# Patient Record
Sex: FEMALE | URBAN - METROPOLITAN AREA
[De-identification: names, ages, dates, MRNs, and addresses within clinical notes are randomized per-mention and may not be internally consistent; named-entity substitution may affect disease eponyms.]

---

## 2020-09-21 ENCOUNTER — OUT OF OFFICE VISIT (OUTPATIENT)
Dept: URBAN - METROPOLITAN AREA MEDICAL CENTER 10 | Facility: MEDICAL CENTER | Age: 35
End: 2020-09-21
Payer: COMMERCIAL

## 2020-09-21 DIAGNOSIS — Z98.84 BARIATRIC SURG STAT-UNSP: ICD-10-CM

## 2020-09-21 DIAGNOSIS — R10.13 ABDOMINAL DISCOMFORT, EPIGASTRIC: ICD-10-CM

## 2020-09-21 DIAGNOSIS — K92.1 BLACK STOOL: ICD-10-CM

## 2020-09-21 DIAGNOSIS — R74.0 TRANSAMINITIS: ICD-10-CM

## 2020-09-21 PROCEDURE — 99213 OFFICE O/P EST LOW 20 MIN: CPT | Performed by: INTERNAL MEDICINE

## 2020-09-21 PROCEDURE — 99205 OFFICE O/P NEW HI 60 MIN: CPT | Performed by: INTERNAL MEDICINE

## 2020-10-16 ENCOUNTER — OFFICE VISIT (OUTPATIENT)
Dept: URBAN - METROPOLITAN AREA TELEHEALTH 2 | Facility: TELEHEALTH | Age: 35
End: 2020-10-16
Payer: COMMERCIAL

## 2020-10-16 DIAGNOSIS — U07.1 COVID-19 VIRUS INFECTION: ICD-10-CM

## 2020-10-16 DIAGNOSIS — Z98.84 GASTRIC BYPASS STATUS FOR OBESITY: ICD-10-CM

## 2020-10-16 DIAGNOSIS — R74.8 ELEVATED LIVER ENZYMES: ICD-10-CM

## 2020-10-16 DIAGNOSIS — R10.13 EPIGASTRIC PAIN: ICD-10-CM

## 2020-10-16 PROCEDURE — G8482 FLU IMMUNIZE ORDER/ADMIN: HCPCS | Performed by: INTERNAL MEDICINE

## 2020-10-16 PROCEDURE — G8417 CALC BMI ABV UP PARAM F/U: HCPCS | Performed by: INTERNAL MEDICINE

## 2020-10-16 PROCEDURE — 99214 OFFICE O/P EST MOD 30 MIN: CPT | Performed by: INTERNAL MEDICINE

## 2020-10-16 PROCEDURE — G9903 PT SCRN TBCO ID AS NON USER: HCPCS | Performed by: INTERNAL MEDICINE

## 2020-10-16 PROCEDURE — G8427 DOCREV CUR MEDS BY ELIG CLIN: HCPCS | Performed by: INTERNAL MEDICINE

## 2020-10-16 PROCEDURE — 1036F TOBACCO NON-USER: CPT | Performed by: INTERNAL MEDICINE

## 2020-10-16 RX ORDER — PANTOPRAZOLE SODIUM 40 MG/1
1 TABLET TABLET, DELAYED RELEASE ORAL ONCE A DAY
Qty: 20 | Refills: 1 | OUTPATIENT
Start: 2020-10-16

## 2020-10-16 RX ORDER — SUCRALFATE 1 G/1
1 TABLET ON AN EMPTY STOMACH TABLET ORAL
Qty: 14 | Refills: 1 | OUTPATIENT
Start: 2020-10-16 | End: 2020-10-30

## 2020-10-16 NOTE — HPI-TODAY'S VISIT:
The patient was admitted to [facility] and seen in consultation by Dr. Hinds. She presented with abdominal pain, which began 3  days prior, sharp, moderate, localized to the epigastric region. On admission the degree of pain was minimal. She had tried Pepto bismol at home and c/o dark stools. No BRBPR. She underwent a gastric sleeve in May 2020.  She denied fevers/chills/CP/SOB/cough/dysuria/hematuria/n/v.   She was noted to have stable Hb but elevated LFTs. Viral hepatitis was negative. COVID testing was positive. She was placed on Pantoprazole QD and Carafate QAC. She tells me today that she is feeling better.  She did have some mild epigastric pain (3-4/10), and it short lived. She has not had any nausea or vomiting. Stools havbe been brown. Appetite has been good. Weight has been stable. She is running low on her meds.  Summary of prior workup: - Labs on 9/22/27 revealed a sodium of 139, potassium 3.7, glucose 78, BUN 11, Cr 0.4, calcium 7.4, AST 78, , AP 92. TBili 0.4, Lipase 61. HbA1c 5.2%. TG 79. Normal B1, B6 and zinc level. WBC 3.5, hemoglobin 13.9, MCV 93. Folate > 20, Ferritin 38, B12 814, Viral hepatitis antibody negative for Hep A, B or C. COVID Positive. FOBT negative. - CT abd/pelvis 9/21/20: No gastric ulceration identified. Fluid-filled small bowel loops with mild enhancement may represent enteritis, predominantly located in the left abdomen. Trace ascites.

## 2022-01-05 ENCOUNTER — OFFICE VISIT (OUTPATIENT)
Dept: URBAN - METROPOLITAN AREA CLINIC 98 | Facility: CLINIC | Age: 37
End: 2022-01-05

## 2022-01-10 ENCOUNTER — OFFICE VISIT (OUTPATIENT)
Dept: URBAN - METROPOLITAN AREA TELEHEALTH 2 | Facility: TELEHEALTH | Age: 37
End: 2022-01-10
Payer: COMMERCIAL

## 2022-01-10 DIAGNOSIS — E27.8 ADRENAL NODULE: ICD-10-CM

## 2022-01-10 DIAGNOSIS — Z98.84 BARIATRIC SURGERY STATUS: ICD-10-CM

## 2022-01-10 DIAGNOSIS — U07.1 COVID-19: ICD-10-CM

## 2022-01-10 DIAGNOSIS — R10.13 EPIGASTRIC PAIN: ICD-10-CM

## 2022-01-10 DIAGNOSIS — K21.9 GERD WITHOUT ESOPHAGITIS: ICD-10-CM

## 2022-01-10 PROCEDURE — 99203 OFFICE O/P NEW LOW 30 MIN: CPT | Performed by: INTERNAL MEDICINE

## 2022-01-10 RX ORDER — ESOMEPRAZOLE MAGNESIUM 20 MG/1
1 CAPSULE CAPSULE, DELAYED RELEASE ORAL ONCE A DAY
Status: ACTIVE | COMMUNITY

## 2022-01-10 RX ORDER — SUCRALFATE 1 G/1
1 TABLET ON AN EMPTY STOMACH TABLET ORAL THREE TIMES A DAY
Status: ACTIVE | COMMUNITY

## 2022-01-10 RX ORDER — MULTIVIT-MIN/IRON/FOLIC ACID/K 18-600-40
AS DIRECTED CAPSULE ORAL
Status: ACTIVE | COMMUNITY

## 2022-01-10 NOTE — HPI-TODAY'S VISIT:
Patient referred by Azeem Pedraza MD fof evaluation of abdominal pain. Copy of this consult OV sent to Dr. Pedraza. This is a Telehealth OV to which patient has agreed to. Patient's @ home during the virtual OV. Limitations of telehealth discussed; she understands and agrees to proceed. 37 yo pt s/p Sleeve gastrectomy 5/20, ( lost 65 lbs after surgery ),w sudden onset of epigastric pain w/o radiation, w no N/V/constipation / diarrhea nor fever or chills and no cardiorespiratory sxs. on 12/15/21. At that time, she was under significant stress, kept erratic eating habits and drank excessive amount of coffee. She was seen @ [facility] ER: normal labs; A + P CT:  circumferential wall thickening of distal esophagus, focal fat deposition in the falciform ligament, 1.7 x 1.5 cm L-adrenal nodule, fluid - filled loops of SB, s/p Sleeve gastrectomy and IUD in place w/o acute findings.  She was Rx'd w Ondansetron / Dicyclomine / Pantoprazole. Currently, she feels  better. no abdominal pain, x for yesterday after having some pasta, and she took one Tramadol. and has been on Nexium OTC qd. Currently w COVID-19 infection w/o respiratory compromise, on quarantine @ home. Due for repeat SARS-CoV-2 test in 2 days. She has had COVID-19 in 3 occasions. No other complaints.

## 2022-01-21 LAB
IRON BIND.CAP.(TIBC): 335
IRON SATURATION: 29
IRON: 96
MAGNESIUM: 2
UIBC: 239
VITAMIN A: 49.6
VITAMIN B12: 610
VITAMIN D, 25-HYDROXY: 32.9
ZINC, PLASMA OR SERUM: 94

## 2022-03-21 ENCOUNTER — OFFICE VISIT (OUTPATIENT)
Dept: URBAN - METROPOLITAN AREA SURGERY CENTER 18 | Facility: SURGERY CENTER | Age: 37
End: 2022-03-21
Payer: COMMERCIAL

## 2022-03-21 DIAGNOSIS — K29.30 CHRONIC SUPERFICIAL GASTRITIS: ICD-10-CM

## 2022-03-21 PROCEDURE — G8907 PT DOC NO EVENTS ON DISCHARG: HCPCS | Performed by: INTERNAL MEDICINE

## 2022-03-21 PROCEDURE — 43239 EGD BIOPSY SINGLE/MULTIPLE: CPT | Performed by: INTERNAL MEDICINE

## 2022-03-21 RX ORDER — SUCRALFATE 1 G/1
1 TABLET ON AN EMPTY STOMACH TABLET ORAL THREE TIMES A DAY
Status: ACTIVE | COMMUNITY

## 2022-03-21 RX ORDER — MULTIVIT-MIN/IRON/FOLIC ACID/K 18-600-40
AS DIRECTED CAPSULE ORAL
Status: ACTIVE | COMMUNITY

## 2022-03-21 RX ORDER — ESOMEPRAZOLE MAGNESIUM 20 MG/1
1 CAPSULE CAPSULE, DELAYED RELEASE ORAL ONCE A DAY
Status: ACTIVE | COMMUNITY

## 2022-05-23 ENCOUNTER — WEB ENCOUNTER (OUTPATIENT)
Dept: URBAN - METROPOLITAN AREA CLINIC 98 | Facility: CLINIC | Age: 37
End: 2022-05-23

## 2022-05-24 ENCOUNTER — DASHBOARD ENCOUNTERS (OUTPATIENT)
Age: 37
End: 2022-05-24

## 2022-05-24 ENCOUNTER — OFFICE VISIT (OUTPATIENT)
Dept: URBAN - METROPOLITAN AREA CLINIC 98 | Facility: CLINIC | Age: 37
End: 2022-05-24
Payer: COMMERCIAL

## 2022-05-24 VITALS
BODY MASS INDEX: 26.62 KG/M2 | TEMPERATURE: 97.2 F | HEIGHT: 61 IN | SYSTOLIC BLOOD PRESSURE: 124 MMHG | DIASTOLIC BLOOD PRESSURE: 83 MMHG | WEIGHT: 141 LBS | HEART RATE: 62 BPM

## 2022-05-24 DIAGNOSIS — U07.1 COVID-19: ICD-10-CM

## 2022-05-24 DIAGNOSIS — K21.9 GERD WITHOUT ESOPHAGITIS: ICD-10-CM

## 2022-05-24 DIAGNOSIS — E27.8 ADRENAL NODULE: ICD-10-CM

## 2022-05-24 DIAGNOSIS — Z98.84 BARIATRIC SURGERY STATUS: ICD-10-CM

## 2022-05-24 PROCEDURE — 99214 OFFICE O/P EST MOD 30 MIN: CPT | Performed by: INTERNAL MEDICINE

## 2022-05-24 RX ORDER — SUCRALFATE 1 G/1
1 TABLET ON AN EMPTY STOMACH TABLET ORAL THREE TIMES A DAY
Status: ON HOLD | COMMUNITY

## 2022-05-24 RX ORDER — ESOMEPRAZOLE MAGNESIUM 20 MG/1
1 CAPSULE CAPSULE, DELAYED RELEASE ORAL ONCE A DAY
Status: ON HOLD | COMMUNITY

## 2022-05-24 RX ORDER — MULTIVIT-MIN/IRON/FOLIC ACID/K 18-600-40
AS DIRECTED CAPSULE ORAL
Status: ACTIVE | COMMUNITY

## 2022-05-24 NOTE — HPI-TODAY'S VISIT:
35 yo pt s/p Sleeve gastrectomy 5/20, ( lost 65 lbs after surgery ), here for abdominal pain follow up.  Today, she reports no abdominal pain dyspepsia, nor constitutional sxs. Has started a healthy diet, regular meals and on Carafate tid / Pantoprazole qd w significant improvement in her sxs. EGD 3 / 22: s/p Sleeve gastrectomy, chronic Hp-negative erosive gastritis w normal duodenal  and esophageal bx's. Labs all normal. As previously noted, seen @ [facility] ER: normal labs; A + P CT:  circumferential wall thickening of distal esophagus, focal fat deposition in the falciform ligament, 1.7 x 1.5 cm L-adrenal nodule, fluid - filled loops of SB, s/p Sleeve gastrectomy and IUD in place w/o acute findings.  Hasn't been able to schedule her abdominal MRI as yet. Had mild  COVID-19 2 mos ago and she's COVID-19 vaccinated.  No other complaints.

## 2022-05-26 PROBLEM — 237783006: Status: ACTIVE | Noted: 2022-01-10

## 2022-05-26 PROBLEM — 266435005: Status: ACTIVE | Noted: 2022-01-10

## 2022-06-01 ENCOUNTER — TELEPHONE ENCOUNTER (OUTPATIENT)
Dept: URBAN - METROPOLITAN AREA CLINIC 98 | Facility: CLINIC | Age: 37
End: 2022-06-01